# Patient Record
Sex: MALE | Race: OTHER | HISPANIC OR LATINO | URBAN - METROPOLITAN AREA
[De-identification: names, ages, dates, MRNs, and addresses within clinical notes are randomized per-mention and may not be internally consistent; named-entity substitution may affect disease eponyms.]

---

## 2020-10-25 ENCOUNTER — EMERGENCY (EMERGENCY)
Facility: HOSPITAL | Age: 25
LOS: 1 days | Discharge: HOME | End: 2020-10-25
Admitting: EMERGENCY MEDICINE
Payer: MEDICAID

## 2020-10-25 VITALS
HEART RATE: 76 BPM | DIASTOLIC BLOOD PRESSURE: 70 MMHG | RESPIRATION RATE: 18 BRPM | OXYGEN SATURATION: 99 % | SYSTOLIC BLOOD PRESSURE: 108 MMHG

## 2020-10-25 VITALS
SYSTOLIC BLOOD PRESSURE: 97 MMHG | WEIGHT: 160.06 LBS | DIASTOLIC BLOOD PRESSURE: 60 MMHG | HEART RATE: 63 BPM | TEMPERATURE: 97 F | HEIGHT: 70 IN | RESPIRATION RATE: 16 BRPM | OXYGEN SATURATION: 96 %

## 2020-10-25 DIAGNOSIS — R41.82 ALTERED MENTAL STATUS, UNSPECIFIED: ICD-10-CM

## 2020-10-25 DIAGNOSIS — F10.129 ALCOHOL ABUSE WITH INTOXICATION, UNSPECIFIED: ICD-10-CM

## 2020-10-25 DIAGNOSIS — F17.200 NICOTINE DEPENDENCE, UNSPECIFIED, UNCOMPLICATED: ICD-10-CM

## 2020-10-25 PROCEDURE — 99284 EMERGENCY DEPT VISIT MOD MDM: CPT

## 2020-10-25 NOTE — ED ADULT NURSE NOTE - OBJECTIVE STATEMENT
[place]. Pt admits to ETOH abuse tonight, denies any drug use. No visible injuries or traumas. Airway patent and breathing is spontaneous and unlabored. Pt responsive to verbal stimuli.

## 2020-10-25 NOTE — ED PROVIDER NOTE - EYES NEGATIVE STATEMENT, MLM
Doing well. Glucose screen next visit.  Instructions given and labs ordered.  Health Maintenance Due   Topic Date Due   • Cervical Cancer Screening HPV CO-Testing  04/28/2014   • Influenza Vaccine (1) 09/01/2018       Patient is due for topics as listed above but declines Immunization(s) Influenza at this time.  Declined.             
no discharge, no irritation, no pain, no redness, and no visual changes.

## 2020-10-25 NOTE — ED PROVIDER NOTE - CROS ED ROS STATEMENT
CM faxed the discharge instructions to [facility] all other ROS negative except as per HPI

## 2020-10-25 NOTE — ED ADULT NURSE NOTE - CHPI ED NUR SYMPTOMS NEG
no chills/no fever/no pain/no abdominal distension/no vomiting/no abdominal pain/no nausea/no weakness

## 2020-10-25 NOTE — ED PROVIDER NOTE - PATIENT PORTAL LINK FT
You can access the FollowMyHealth Patient Portal offered by [facility] by registering at the following website: [email or web address]. By joining [employer]’s FollowMyHealth portal, you will also be able to view your health information using other applications (apps) compatible with our system.

## 2020-10-25 NOTE — ED ADULT NURSE NOTE - NSFALLRSKASSISTTYPE_ED_ALL_ED
Woke up this AM with burning upon urination. Took AZO to help with the pain.    Toileting/Standing/Walking

## 2020-10-25 NOTE — ED ADULT TRIAGE NOTE - CHIEF COMPLAINT QUOTE
Pt brought in by EMS after [place] [address] Pt awakens to voice but falls back to sleep, unable to answer questions. Per EMS, pt admitted to drinking alcohol tonight.

## 2020-11-30 ENCOUNTER — EMERGENCY (EMERGENCY)
Facility: HOSPITAL | Age: 25
LOS: 1 days | Discharge: HOME | End: 2020-11-30
Admitting: EMERGENCY MEDICINE
Payer: MEDICAID

## 2020-11-30 VITALS
OXYGEN SATURATION: 97 % | RESPIRATION RATE: 16 BRPM | WEIGHT: 179.9 LBS | SYSTOLIC BLOOD PRESSURE: 106 MMHG | HEIGHT: 70 IN | TEMPERATURE: 98 F | HEART RATE: 64 BPM | DIASTOLIC BLOOD PRESSURE: 69 MMHG

## 2020-11-30 LAB — GLUCOSE BLDC GLUCOMTR-MCNC: 105 MG/DL — HIGH (ref 70–99)

## 2020-11-30 PROCEDURE — 99284 EMERGENCY DEPT VISIT MOD MDM: CPT

## 2020-11-30 NOTE — ED PROVIDER NOTE - CLINICAL SUMMARY MEDICAL DECISION MAKING FREE TEXT BOX
pt here for public intox 2/2 heroin overdose, euglycemic, monitored in the ED with improved mental status, AFVSS, currently ambulatory with steady gait, tolerating PO without N/V, clear speech, without additional medical complaints noted.  Repeat exam and neuro/cranial nerve exams wnl.  No evidence of head/neck trauma. Pt feels much better and safe for discharge. Counseling provided. Medically stable for d/c.

## 2020-11-30 NOTE — ED ADULT NURSE NOTE - CHPI ED NUR SYMPTOMS NEG
no pain/no tingling/no weakness/no chills/no dizziness/no vomiting/no nausea/no decreased eating/drinking/no fever

## 2020-11-30 NOTE — ED PROVIDER NOTE - CARE PLAN
Principal Discharge DX:	Heroin overdose  Secondary Diagnosis:	Altered mental status associated with intoxication

## 2020-11-30 NOTE — ED PROVIDER NOTE - PHYSICAL EXAMINATION
Gen - WDWN M, somnolent but arousable to verbal stimuli, no acute distress  Skin - warm, dry, intact  HEENT - AT/NC, PERRL, mild conjunctival injection, pupils 3mm b/l, TM intact with no hemotympanium b/l, no facial contusion or periorbital ecchymosis, o/p clear, uvula midline, airway patent, neck supple with no step off or midline tenderness, FROM   CV - S1S2, R/R/R  Resp - respiration non-labored, CTAB, symmetric bs b/l, no r/r/w  GI - NABS, soft, ND, NT, no rebound or guarding, no CVAT b/l  MS - w/w/p, no c/c/e, calves supple and NT  Neuro - somnolent but arousbale to verbal stimuli, moving all extremities, no nystagmus, garbled speech, unable to ambulate currently

## 2020-11-30 NOTE — ED PROVIDER NOTE - PATIENT PORTAL LINK FT
You can access the FollowMyHealth Patient Portal offered by [facility] by registering at the following website: [email or web address]. By joining [employer]’s FollowMyHealth portal, you will also be able to view your health information using other applications (apps) compatible with our system.

## 2020-11-30 NOTE — ED ADULT NURSE NOTE - OBJECTIVE STATEMENT
dmits to heroin use- was denied entry to shelter because he was AMS- FS 95- is responsive to voice and follows commands and presents with no obvious injury or trauma

## 2020-11-30 NOTE — ED ADULT TRIAGE NOTE - CHIEF COMPLAINT QUOTE
admits to heroin use- was denied entry to shelter because he was AMS- FS 95- is responsive to voice and follows commands and presents with no obvious injury or trauma

## 2020-11-30 NOTE — ED PROVIDER NOTE - OBJECTIVE STATEMENT
24 yo M with PMHx of drug dependence, BIBA from shelter for AMS.  Pt was found altered by the bathroom in the shelter with AMS.  Admits to heroin use tonight but no other coingestion noted.  No acute medical complaints or apparent trauma noted.  Minimally cooperative with hx and ROS due to heavy intox. No bleeding, respiratory distress, pain, vomiting, or urinary/bowel incontinence noted.

## 2020-12-01 VITALS
DIASTOLIC BLOOD PRESSURE: 60 MMHG | HEART RATE: 60 BPM | SYSTOLIC BLOOD PRESSURE: 100 MMHG | RESPIRATION RATE: 14 BRPM | TEMPERATURE: 98 F | OXYGEN SATURATION: 97 %

## 2020-12-04 DIAGNOSIS — R41.82 ALTERED MENTAL STATUS, UNSPECIFIED: ICD-10-CM

## 2020-12-04 DIAGNOSIS — T40.1X1A POISONING BY HEROIN, ACCIDENTAL (UNINTENTIONAL), INITIAL ENCOUNTER: ICD-10-CM

## 2020-12-04 DIAGNOSIS — F11.129 OPIOID ABUSE WITH INTOXICATION, UNSPECIFIED: ICD-10-CM

## 2021-01-25 NOTE — ED PROVIDER NOTE - CCCP TRG CHIEF CMPLNT
Impression: Type 2 diabetes mellitus without complications: N91.2. Plan:  No NV on FA, No DR on exam

 BG/BP control Note to PCP  Yearly checks altered mental status

## 2021-02-14 ENCOUNTER — EMERGENCY (EMERGENCY)
Facility: HOSPITAL | Age: 26
LOS: 1 days | Discharge: HOME | End: 2021-02-14
Attending: EMERGENCY MEDICINE | Admitting: EMERGENCY MEDICINE
Payer: MEDICAID

## 2021-02-14 VITALS
DIASTOLIC BLOOD PRESSURE: 82 MMHG | OXYGEN SATURATION: 98 % | WEIGHT: 179.9 LBS | SYSTOLIC BLOOD PRESSURE: 143 MMHG | TEMPERATURE: 98 F | HEART RATE: 87 BPM | RESPIRATION RATE: 16 BRPM
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
## 2021-02-14 DIAGNOSIS — M79.644 PAIN IN RIGHT FINGER(S): ICD-10-CM
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
## 2021-02-14 DIAGNOSIS — S60.442A EXTERNAL CONSTRICTION OF RIGHT MIDDLE FINGER, INITIAL ENCOUNTER: ICD-10-CM

## 2021-02-14 PROCEDURE — 99283 EMERGENCY DEPT VISIT LOW MDM: CPT

## 2021-02-14 NOTE — ED PROVIDER NOTE - CLINICAL SUMMARY MEDICAL DECISION MAKING FREE TEXT BOX
Pt presenting with a ring stuck on the R middle finger. Attempting hand elevation and Coban to see if ring can be removed without cutting it. Otherwise, will use either karla wire ring cutter or Dremel to remove ring. Pt presenting with a ring stuck on the R middle finger. Attempting hand elevation and Coban to see if ring can be removed without cutting it. Otherwise, will use either karla wire ring cutter or Dremel to remove ring. Hal UTD

## 2021-02-14 NOTE — ED PROCEDURE NOTE - CPROC ED COMPLICATIONS1
----- Message from Zafar Turner DO sent at 2/4/2021 10:55 AM CST -----  Please contact patient:  His hemoglobin A1 c remains well controlled at 6.3.  Please remind him about 6 month A1c follow-up.  No changes to his diabetes therapy.  Keep up the great work.   no

## 2021-02-14 NOTE — ED PROVIDER NOTE - ADDITIONAL NOTES AND INSTRUCTIONS:
Mr. Mendiola was seen in the ER today for a large ring that was stuck on his Right middle finger. It required emergency removal with an electric ring cutter to prevent permanent damage to the finger. Please excuse lateness to work.

## 2021-02-14 NOTE — ED PROVIDER NOTE - PATIENT PORTAL LINK FT
You can access the FollowMyHealth Patient Portal offered by [facility] by registering at the following website: [email or web address]. By joining [employer]’s FollowMyHealth portal, you will also be able to view your health information using other applications (apps) compatible with our system.

## 2021-02-14 NOTE — ED PROVIDER NOTE - OBJECTIVE STATEMENT
Attended group therapy 26 y/o M with no PMHx presents to the ED for R 3rd digit pain in the setting of a large gold ring with a thick band that is stuck on the finger. Pt reports he normally wears the ring on the ring finger. He went to bed last night with the ring partially on his middle finger. He woke up this morning and noted the ring was pushed down his finger. He currently endorses swelling and pain underneath the band. Pt has been unable to remove the ring from his finger. He otherwise denies any pain distal to the ring.

## 2021-02-14 NOTE — ED ADULT NURSE NOTE - NSIMPLEMENTINTERV_GEN_ALL_ED
Implemented All Universal Safety Interventions:  [place] to call system. Call bell, personal items and telephone within reach. Instruct patient to call for assistance. Room bathroom lighting operational. Non-slip footwear when patient is off stretcher. Physically safe environment: no spills, clutter or unnecessary equipment. Stretcher in lowest position, wheels locked, appropriate side rails in place.

## 2021-02-14 NOTE — ED PROVIDER NOTE - NSFOLLOWUPINSTRUCTIONS_ED_ALL_ED_FT
Ring removed with ring cutter.    Please keep an eye out for signs of infection at places where the skin broke.

## 2021-09-24 PROBLEM — F19.10 OTHER PSYCHOACTIVE SUBSTANCE ABUSE, UNCOMPLICATED: Chronic | Status: ACTIVE | Noted: 2020-11-30
